# Patient Record
Sex: MALE | Race: WHITE | NOT HISPANIC OR LATINO | Employment: FULL TIME | URBAN - METROPOLITAN AREA
[De-identification: names, ages, dates, MRNs, and addresses within clinical notes are randomized per-mention and may not be internally consistent; named-entity substitution may affect disease eponyms.]

---

## 2017-05-18 ENCOUNTER — ALLSCRIPTS OFFICE VISIT (OUTPATIENT)
Dept: OTHER | Facility: OTHER | Age: 47
End: 2017-05-18

## 2017-06-06 ENCOUNTER — TRANSCRIBE ORDERS (OUTPATIENT)
Dept: ADMINISTRATIVE | Facility: HOSPITAL | Age: 47
End: 2017-06-06

## 2017-06-06 DIAGNOSIS — I48.91 ATRIAL FIBRILLATION, UNSPECIFIED TYPE (HCC): Primary | ICD-10-CM

## 2017-06-21 ENCOUNTER — HOSPITAL ENCOUNTER (OUTPATIENT)
Dept: NON INVASIVE DIAGNOSTICS | Facility: CLINIC | Age: 47
Discharge: HOME | End: 2017-06-21
Payer: COMMERCIAL

## 2017-06-21 DIAGNOSIS — I48.91 ATRIAL FIBRILLATION, UNSPECIFIED TYPE (HCC): ICD-10-CM

## 2017-06-21 PROCEDURE — 93306 TTE W/DOPPLER COMPLETE: CPT

## 2017-08-01 ENCOUNTER — ALLSCRIPTS OFFICE VISIT (OUTPATIENT)
Dept: OTHER | Facility: OTHER | Age: 47
End: 2017-08-01

## 2017-09-01 ENCOUNTER — ALLSCRIPTS OFFICE VISIT (OUTPATIENT)
Dept: OTHER | Facility: OTHER | Age: 47
End: 2017-09-01

## 2017-09-01 DIAGNOSIS — Z13.1 ENCOUNTER FOR SCREENING FOR DIABETES MELLITUS: ICD-10-CM

## 2017-09-01 DIAGNOSIS — I10 ESSENTIAL (PRIMARY) HYPERTENSION: ICD-10-CM

## 2017-09-01 DIAGNOSIS — Z13.220 ENCOUNTER FOR SCREENING FOR LIPOID DISORDERS: ICD-10-CM

## 2017-09-01 DIAGNOSIS — M25.475 EFFUSION OF LEFT FOOT: ICD-10-CM

## 2017-09-02 ENCOUNTER — GENERIC CONVERSION - ENCOUNTER (OUTPATIENT)
Dept: OTHER | Facility: OTHER | Age: 47
End: 2017-09-02

## 2017-09-02 LAB
AMBIG ABBREV CMP14 DEFAULT (HISTORICAL): NORMAL
AMBIG ABBREV LP DEFAULT (HISTORICAL): NORMAL

## 2017-09-03 LAB
A/G RATIO (HISTORICAL): 1.4 (ref 1.2–2.2)
ALBUMIN SERPL BCP-MCNC: 4.1 G/DL (ref 3.5–5.5)
ALP SERPL-CCNC: 69 IU/L (ref 39–117)
ALT SERPL W P-5'-P-CCNC: 19 IU/L (ref 0–44)
AST SERPL W P-5'-P-CCNC: 15 IU/L (ref 0–40)
BILIRUB SERPL-MCNC: 0.4 MG/DL (ref 0–1.2)
BUN SERPL-MCNC: 11 MG/DL (ref 6–24)
BUN/CREA RATIO (HISTORICAL): 14 (ref 9–20)
CALCIUM SERPL-MCNC: 9.1 MG/DL (ref 8.7–10.2)
CHLORIDE SERPL-SCNC: 98 MMOL/L (ref 96–106)
CHOLEST SERPL-MCNC: 123 MG/DL (ref 100–199)
CO2 SERPL-SCNC: 23 MMOL/L (ref 18–29)
CREAT SERPL-MCNC: 0.79 MG/DL (ref 0.76–1.27)
EGFR AFRICAN AMERICAN (HISTORICAL): 122 ML/MIN/1.73
EGFR-AMERICAN CALC (HISTORICAL): 105 ML/MIN/1.73
GLUCOSE SERPL-MCNC: 100 MG/DL (ref 65–99)
HDLC SERPL-MCNC: 39 MG/DL
LDLC SERPL CALC-MCNC: 56 MG/DL (ref 0–99)
POTASSIUM SERPL-SCNC: 4.3 MMOL/L (ref 3.5–5.2)
SODIUM SERPL-SCNC: 138 MMOL/L (ref 134–144)
TOT. GLOBULIN, SERUM (HISTORICAL): 2.9 G/DL (ref 1.5–4.5)
TOTAL PROTEIN (HISTORICAL): 7 G/DL (ref 6–8.5)
TRIGL SERPL-MCNC: 141 MG/DL (ref 0–149)
URIC ACID (HISTORICAL): 5.3 MG/DL (ref 3.7–8.6)
VLDLC SERPL CALC-MCNC: 28 MG/DL (ref 5–40)

## 2017-09-08 ENCOUNTER — HOSPITAL ENCOUNTER (OUTPATIENT)
Dept: ULTRASOUND IMAGING | Facility: CLINIC | Age: 47
Discharge: HOME | End: 2017-09-08
Payer: COMMERCIAL

## 2017-09-08 ENCOUNTER — APPOINTMENT (OUTPATIENT)
Dept: RADIOLOGY | Facility: CLINIC | Age: 47
End: 2017-09-08
Payer: COMMERCIAL

## 2017-09-08 DIAGNOSIS — M25.475 EFFUSION OF LEFT FOOT: ICD-10-CM

## 2017-09-08 PROCEDURE — 93971 EXTREMITY STUDY: CPT

## 2017-09-08 PROCEDURE — 73630 X-RAY EXAM OF FOOT: CPT

## 2018-01-13 VITALS
OXYGEN SATURATION: 96 % | TEMPERATURE: 97.6 F | BODY MASS INDEX: 37.31 KG/M2 | SYSTOLIC BLOOD PRESSURE: 130 MMHG | HEIGHT: 73 IN | HEART RATE: 82 BPM | DIASTOLIC BLOOD PRESSURE: 84 MMHG | WEIGHT: 281.5 LBS

## 2018-01-13 NOTE — PROGRESS NOTES
Assessment    1  Acute bacterial conjunctivitis of left eye (372 03) (H10 32)   2  Acute bacterial conjunctivitis of right eye (372 03) (H10 31)    Plan  Acute bacterial conjunctivitis of right eye    · TobraDex 0 3-0 1 % Ophthalmic Ointment; APPLY 1/2" RIBBON TO EYELIDS  TWICE DAILY    Discussion/Summary    Warm, compresses to both eyes 2-3 times a day  Strict handwashing  Wash pillowcases  Avoid wearing contact lenses  Chief Complaint  Patient is here today with complaints of red irritated eyes      History of Present Illness  HPI: Pt has had cough and cold symptoms for the past week  He developed right eye swelling over the past few days  he now has mild swelling in both eyes with crusting discharge  Cough and cold symptoms are improving  htn-f/b Dr Keyes Orn- dx 2012  f/b Dr Luh Calixto  Started [facility] last week  Review of Systems    Constitutional: no fever or chills, feels well, no tiredness, no recent weight loss or weight gain  ENT: nasal discharge, but as noted in HPI  Cardiovascular: no complaints of slow or fast heart rate, no chest pain, no palpitations, no leg claudication or lower extremity edema  Respiratory: no complaints of shortness of breath, no wheezing or cough, no dyspnea on exertion, no orthopnea or PND  Integumentary: no complaints of skin rash or lesion, no itching or dry skin, no skin wounds  Other Symptoms: eyes: swollen and crusted discharge bilateral       Active Problems    1  Actinic keratosis (702 0) (L57 0)   2  Atrial fibrillation (427 31) (I48 91)   3  Atypical 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 9) (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 9)   4  Esophageal reflux (530 81) (K21 9)   5  Hypertension (401 9) (I10)   6  Need for diphtheria-tetanus-pertussis (Tdap) vaccine, adult/adolescent (V06 1) (Z23)   7  Need for influenza vaccination (V04 81) (Z23)   8  Need for pneumococcal vaccination (V03 82) (Z23)   9  Negative depression screening    Past Medical History    1   H/O Malignant melanoma (V10 82) (Z85 820)   2  H/O nonmelanoma skin cancer (V10 83) (B45 857)   3  History of gastroesophageal reflux (GERD) (V12 79) (Z87 19)   4  History of hypercholesterolemia (V12 29) (Z86 39)   5  History of Vitamin D deficiency (268 9) (E55 9)  Active Problems And Past Medical History Reviewed: The active problems and past medical history were reviewed and updated today  Family History  Family History Reviewed: The family history was reviewed and updated today  Social History  The social history was reviewed and updated today  The social history was reviewed and is unchanged  Surgical History  Surgical History Reviewed: The surgical history was reviewed and updated today  Current Meds   1  Atorvastatin Calcium 10 MG Oral Tablet; take 1 tablet daily at bedtime; Therapy: 62TFS1812 to (Evaluate:08Jan2016)  Requested for: 13Apr2015; Last   Rx:13Apr2015 Ordered   2  Clopidogrel Bisulfate 75 MG Oral Tablet; TAKE 1 TABLET BY MOUTH EVERY DAY; Therapy: 94KHD7549 to (Evaluate:21Feb2016)  Requested for: 84OUB0659; Last   Rx:68Fpi9255 Ordered   3  Dilt- MG Oral Capsule Extended Release 24 Hour; take 1 capsule daily; Therapy: 12JEC8612 to (Last Rx:13Apr2015)  Requested for: 13Apr2015 Ordered   4  Flecainide Acetate 50 MG Oral Tablet; TAKE 1 TABLET TWICE DAILY; Therapy: (Recorded:57Ztf0816) to Recorded   5  Ibuprofen 600 MG Oral Tablet; TAKE 1 TABLET 3 TIMES DAILY WITH FOOD AS   NEEDED; Therapy: 21QSZ7218 to (Evaluate:03Jan2016)  Requested for: 01HGA8947; Last   Rx:46Dyi0380 Ordered   6  Multi-Vitamins TABS; Therapy: (Recorded:42Mqf0837) to Recorded   7  TiZANidine HCl - 2 MG Oral Tablet; TAKE 1 TABLET Bedtime; Therapy: 84IJN5943 to (Evaluate:13Jan2016)  Requested for: 36PDH9504; Last   Rx:00Mzd9492 Ordered   8  Vitamin D 1000 UNIT Oral Tablet Recorded   9  Xarelto 20 MG Oral Tablet; take 1 tablet every day; Therapy: (Recorded:77Gfr3302) to Recorded    Allergies    1  Aspirin CHEW   2  Penicillins   3  Tetracycline HCl CAPS    Vitals   Recorded: 79Qws7820 01:38PM   Temperature 97 6 F   Heart Rate 82   Systolic 998   Diastolic 84   Height 6 ft 1 in   Weight 281 lb 8 0 oz   BMI Calculated 37 14   BSA Calculated 2 48   O2 Saturation 96     Physical Exam    Constitutional   General appearance: No acute distress, well appearing and well nourished  Eyes   Conjunctiva and lids: Abnormal   (bilateral sclera are reddened, conjunctivae injected, + yellow stringy discharge in both eyes  )   Pupils and irises: Equal, round and reactive to light  Ears, Nose, Mouth, and Throat   External inspection of ears and nose: Normal     Otoscopic examination: Tympanic membrance translucent with normal light reflex  Canals patent without erythema  Nasal mucosa, septum, and turbinates: Abnormal   clear rhinorrhea  Oropharynx: Normal with no erythema, edema, exudate or lesions  Pulmonary   Respiratory effort: No increased work of breathing or signs of respiratory distress  Auscultation of lungs: Clear to auscultation, equal breath sounds bilaterally, no wheezes, no rales, no rhonci  Cardiovascular   Auscultation of heart: Normal rate and rhythm, normal S1 and S2, without murmurs  Lymphatic   Palpation of lymph nodes in neck: No lymphadenopathy  Musculoskeletal   Gait and station: Normal     Psychiatric   Orientation to person, place and time: Normal     Mood and affect: Normal          Future Appointments    Date/Time Provider Specialty Site   11/21/2017 08:00 AM MAGDY Ramos   Dermatology [facility]     Signatures   Electronically signed by : Carol Mckeon NP; Aug  1 2017  2:16PM EST                       (Author)    Electronically signed by : Dave Lyons MD; Aug  1 2017  4:46PM EST                       (Co-author)

## 2018-01-14 VITALS
SYSTOLIC BLOOD PRESSURE: 122 MMHG | TEMPERATURE: 97.9 F | WEIGHT: 276.25 LBS | OXYGEN SATURATION: 98 % | BODY MASS INDEX: 36.61 KG/M2 | HEIGHT: 73 IN | HEART RATE: 53 BPM | DIASTOLIC BLOOD PRESSURE: 80 MMHG

## 2018-02-08 ENCOUNTER — OFFICE VISIT (OUTPATIENT)
Dept: FAMILY MEDICINE CLINIC | Facility: CLINIC | Age: 48
End: 2018-02-08
Payer: COMMERCIAL

## 2018-02-08 VITALS
TEMPERATURE: 97.2 F | OXYGEN SATURATION: 95 % | BODY MASS INDEX: 37.73 KG/M2 | DIASTOLIC BLOOD PRESSURE: 80 MMHG | HEIGHT: 72 IN | SYSTOLIC BLOOD PRESSURE: 126 MMHG | WEIGHT: 278.6 LBS | HEART RATE: 50 BPM

## 2018-02-08 DIAGNOSIS — I48.20 CHRONIC ATRIAL FIBRILLATION (HCC): ICD-10-CM

## 2018-02-08 DIAGNOSIS — R09.82 POST-NASAL DRIP: ICD-10-CM

## 2018-02-08 DIAGNOSIS — I10 ESSENTIAL HYPERTENSION: ICD-10-CM

## 2018-02-08 DIAGNOSIS — R68.89 INTOLERANCE TO COLD: ICD-10-CM

## 2018-02-08 DIAGNOSIS — R05.9 COUGH: Primary | ICD-10-CM

## 2018-02-08 PROBLEM — D22.9 ATYPICAL NEVUS OF MULTIPLE SITES: Status: ACTIVE | Noted: 2017-05-18

## 2018-02-08 PROCEDURE — 99214 OFFICE O/P EST MOD 30 MIN: CPT | Performed by: NURSE PRACTITIONER

## 2018-02-08 PROCEDURE — 3074F SYST BP LT 130 MM HG: CPT | Performed by: NURSE PRACTITIONER

## 2018-02-08 PROCEDURE — 3008F BODY MASS INDEX DOCD: CPT | Performed by: NURSE PRACTITIONER

## 2018-02-08 PROCEDURE — 3079F DIAST BP 80-89 MM HG: CPT | Performed by: NURSE PRACTITIONER

## 2018-02-08 RX ORDER — BENZONATATE 200 MG/1
200 CAPSULE ORAL 3 TIMES DAILY PRN
Qty: 20 CAPSULE | Refills: 0 | Status: SHIPPED | OUTPATIENT
Start: 2018-02-08

## 2018-02-08 RX ORDER — ATORVASTATIN CALCIUM 10 MG/1
1 TABLET, FILM COATED ORAL DAILY
COMMUNITY
Start: 2012-03-26

## 2018-02-08 RX ORDER — FLECAINIDE ACETATE 100 MG/1
1 TABLET ORAL EVERY 12 HOURS
COMMUNITY

## 2018-02-08 RX ORDER — METOPROLOL SUCCINATE 25 MG/1
25 TABLET, EXTENDED RELEASE ORAL DAILY
COMMUNITY

## 2018-02-08 RX ORDER — DIPHENOXYLATE HYDROCHLORIDE AND ATROPINE SULFATE 2.5; .025 MG/1; MG/1
1 TABLET ORAL DAILY
COMMUNITY

## 2018-02-08 RX ORDER — FUROSEMIDE 40 MG/1
1 TABLET ORAL DAILY
COMMUNITY
Start: 2017-09-01

## 2018-02-08 NOTE — PROGRESS NOTES
Assessment/Plan:    No problem-specific Assessment & Plan notes found for this encounter  Diagnoses and all orders for this visit:    Cough  Comments:  Post tussive appearing to be related to his viral illness ordered tessalon pearles   Orders:  -     benzonatate (TESSALON) 200 MG capsule; Take 1 capsule (200 mg total) by mouth 3 (three) times a day as needed for cough    Post-nasal drip  -     benzonatate (TESSALON) 200 MG capsule; Take 1 capsule (200 mg total) by mouth 3 (three) times a day as needed for cough    Chronic atrial fibrillation (HCC)  -     CBC and differential; Future  -     Comprehensive metabolic panel; Future  -     TSH, 3rd generation with T4 reflex; Future    Essential hypertension  -     CBC and differential; Future  -     Comprehensive metabolic panel; Future  -     TSH, 3rd generation with T4 reflex; Future    Intolerance to cold  Comments:  Discussed with patient will update his labs including thyroid, CBC and CMP  Orders:  -     CBC and differential; Future  -     Comprehensive metabolic panel; Future  -     TSH, 3rd generation with T4 reflex; Future    Other orders  -     atorvastatin (LIPITOR) 10 mg tablet; Take 1 tablet by mouth daily  -     flecainide (TAMBOCOR) 100 mg tablet; Take 1 tablet by mouth every 12 (twelve) hours  -     furosemide (LASIX) 40 mg tablet; Take 1 tablet by mouth daily  -     metoprolol succinate (TOPROL-XL) 25 mg 24 hr tablet; Take 25 mg by mouth daily  -     multivitamin (THERAGRAN) TABS; Take 1 tablet by mouth daily  -     cholecalciferol (VITAMIN D3) 1,000 units tablet; Take 1 tablet by mouth daily  -     rivaroxaban (XARELTO) 20 mg tablet; Take 1 tablet by mouth daily          Vitals:    02/08/18 1529   BP: 126/80   Pulse: (!) 50   Temp: (!) 97 2 °F (36 2 °C)   SpO2: 95%       Subjective:      Patient ID: Luigi Hopkins is a 50 y o  male  Patient here with his wife and states that he is having coughing mostly at night   He states that his symptoms started about a week and a half ago with sinus congestion and coughing  Patient denies any fevers and does just still have a lingering cough at night and early Am  Patient states that he is unable to take OTC medications other than Tylenol r/t his other medications  Patient follows closely with Dr Anastasia Barnes for his Atrial fibrillation and aware of his low heart rate  Patient does have complaints of feeling cold intolerance at times and feeling sluggish at times  The following portions of the patient's history were reviewed and updated as appropriate: He  has no past medical history on file  He  does not have any pertinent problems on file  He  has no past surgical history on file  His family history is not on file  He  reports that he has never smoked  He does not have any smokeless tobacco history on file  His alcohol and drug histories are not on file  He is allergic to aspirin; penicillins; and tetracycline       Review of Systems   Constitutional: Positive for fatigue  Negative for activity change, appetite change, chills, diaphoresis, fever and unexpected weight change  HENT: Positive for postnasal drip and rhinorrhea  Negative for congestion, ear discharge, ear pain, hearing loss, sinus pain, sinus pressure, sneezing and sore throat  Eyes: Negative for pain, redness and visual disturbance  Respiratory: Positive for cough  Negative for choking, chest tightness, shortness of breath, wheezing and stridor  Cardiovascular: Negative for chest pain, palpitations and leg swelling  Gastrointestinal: Negative for abdominal pain, diarrhea, nausea and vomiting  Musculoskeletal: Negative for arthralgias  Neurological: Negative for dizziness and light-headedness  Psychiatric/Behavioral: Negative for behavioral problems and dysphoric mood  Objective:     Physical Exam   Constitutional: He is oriented to person, place, and time   Vital signs are normal  He appears well-developed and well-nourished  No distress  HENT:   Head: Normocephalic and atraumatic  Eyes: Pupils are equal, round, and reactive to light  Neck: Normal range of motion  No thyromegaly present  Cardiovascular: Normal rate, regular rhythm, normal heart sounds and intact distal pulses  No murmur heard  Pulmonary/Chest: Effort normal and breath sounds normal  No respiratory distress  He has no wheezes  Abdominal: Soft  Bowel sounds are normal    Musculoskeletal: Normal range of motion  Neurological: He is alert and oriented to person, place, and time  Skin: Skin is warm and dry  Psychiatric: He has a normal mood and affect

## 2018-05-24 ENCOUNTER — OFFICE VISIT (OUTPATIENT)
Dept: DERMATOLOGY | Facility: CLINIC | Age: 48
End: 2018-05-24
Payer: COMMERCIAL

## 2018-05-24 DIAGNOSIS — L57.0 ACTINIC KERATOSIS: Primary | ICD-10-CM

## 2018-05-24 DIAGNOSIS — L82.1 SEBORRHEIC KERATOSIS: ICD-10-CM

## 2018-05-24 DIAGNOSIS — Z85.820 HISTORY OF MELANOMA: ICD-10-CM

## 2018-05-24 DIAGNOSIS — Z85.828 HISTORY OF SKIN CANCER: ICD-10-CM

## 2018-05-24 DIAGNOSIS — Z13.89 SCREENING FOR SKIN CONDITION: ICD-10-CM

## 2018-05-24 PROCEDURE — 17000 DESTRUCT PREMALG LESION: CPT | Performed by: DERMATOLOGY

## 2018-05-24 PROCEDURE — 99213 OFFICE O/P EST LOW 20 MIN: CPT | Performed by: DERMATOLOGY

## 2018-05-24 PROCEDURE — 17003 DESTRUCT PREMALG LES 2-14: CPT | Performed by: DERMATOLOGY

## 2018-05-24 NOTE — PROGRESS NOTES
[address] [facility] [address] [facility] DERMATOLOGY  [address] [place] [address]     MRN: [id number] : 1970  Encounter: [id number]  Patient Information: Kelly Sheikh  Chief complaint: six-month checkup    History of present illness:  39-year-old male with history of both melanoma nonmelanoma skin cancer and strong family history of skin melanoma presents for overall skin check no specific concerns noted today  History reviewed  No pertinent past medical history  History reviewed  No pertinent surgical history  Social History   History   Alcohol Use No     History   Drug Use No     History   Smoking Status    Never Smoker   Smokeless Tobacco    Never Used     No family history on file  Meds/Allergies   Allergies   Allergen Reactions    Aspirin      Reaction Date: ;     Penicillins     Tetracycline        Meds:  Prior to Admission medications    Medication Sig Start Date End Date Taking?  Authorizing Provider   atorvastatin (LIPITOR) 10 mg tablet Take 1 tablet by mouth daily 3/26/12  Yes Historical Provider, MD   cholecalciferol (VITAMIN D3) 1,000 units tablet Take 1 tablet by mouth daily   Yes Historical Provider, MD   flecainide (TAMBOCOR) 100 mg tablet Take 1 tablet by mouth every 12 (twelve) hours   Yes Historical Provider, MD   metoprolol succinate (TOPROL-XL) 25 mg 24 hr tablet Take 25 mg by mouth daily   Yes Historical Provider, MD   metoprolol tartrate (LOPRESSOR) 25 mg tablet  18  Yes Historical Provider, MD   multivitamin (THERAGRAN) TABS Take 1 tablet by mouth daily   Yes Historical Provider, MD   rivaroxaban (XARELTO) 20 mg tablet Take 1 tablet by mouth daily   Yes Historical Provider, MD   benzonatate (TESSALON) 200 MG capsule Take 1 capsule (200 mg total) by mouth 3 (three) times a day as needed for cough 18   JAVED Moore   furosemide (LASIX) 40 mg tablet Take 1 tablet by mouth daily 17   Historical Provider, MD Subjective:     Review of Systems:    General: negative for - chills, fatigue, fever,  weight gain or weight loss  Psychological: negative for - anxiety, behavioral disorder, concentration difficulties, decreased libido, depression, irritability, memory difficulties, mood swings, sleep disturbances or suicidal ideation  ENT: negative for - hearing difficulties , nasal congestion, nasal discharge, oral lesions, sinus pain, sneezing, sore throat  Allergy and Immunology: negative for - hives, insect bite sensitivity,  Hematological and Lymphatic: negative for - bleeding problems, blood clots,bruising, swollen lymph nodes  Endocrine: negative for - hair pattern changes, hot flashes, malaise/lethargy, mood swings, palpitations, polydipsia/polyuria, skin changes, temperature intolerance or unexpected weight change  Respiratory: negative for - cough, hemoptysis, orthopnea, shortness of breath, or wheezing  Cardiovascular: negative for - chest pain, dyspnea on exertion, edema,  Gastrointestinal: negative for - abdominal pain, nausea/vomiting  Genito-Urinary: negative for - dysuria, incontinence, irregular/heavy menses or urinary frequency/urgency  Musculoskeletal: negative for - gait disturbance, joint pain, joint stiffness, joint swelling, muscle pain, muscular weakness  Dermatological:  As in HPI  Neurological: negative for confusion, dizziness, headaches, impaired coordination/balance, memory loss, numbness/tingling, seizures, speech problems, tremors or weakness       Objective: There were no vitals taken for this visit      Physical Exam:    General Appearance:    Alert, cooperative, no distress   Head:    Normocephalic, without obvious abnormality, atraumatic   Lymphatics:    No lymphadenopathy noted      Abdomen:   No hepatosplenomegaly   Skin:   A full skin exam was performed including scalp, head scalp, eyes, ears, nose, lips, neck, chest, axilla, abdomen, back, buttocks, bilateral upper extremities, bilateral lower extremities, hands, feet, fingers, toes, fingernails, and toenails  Scaling erythematous areas as noted below normal keratotic papules with greasy stuck on appearance normal pigmented lesions with regular shape and color nothing else markedly atypical previous site of skin cancer and melanoma well healed without recurrence   Physical Exam   HENT:   Head:         Cryotherapy Procedure Note    Pre-operative Diagnosis: actinic keratosis    Plan:  1  Instructed to keep the area dry and clean thereafter  Apply petrolatum if area gets crusty  2  Recommended that the patient use acetaminophen  as needed for pain  Locations:  scalp    Indications: Destruction of  Actinic keratosis x3    Patient informed of risks (permanent scarring, infection, light or dark discoloration, bleeding, infection, weakness, numbness and recurrence of the lesion) and benefits of the procedure and verbal informed consent obtained  The areas are treated with liquid nitrogen therapy, frozen until ice ball extended 2 mm beyond lesion, allowed to thaw, and treated again  The patient tolerated procedure well  The patient was instructed on post-op care, warned that there may be blister formation, redness and pain  Recommend OTC analgesia as needed for pain  Condition:  Stable    Complications:  none  Assessment:     1  Actinic keratosis     2  Seborrheic keratosis     3  Screening for skin condition     4  History of skin cancer     5  History of melanoma      4/12 superficial spreading Horacio level III Breslow thicknes 0 58mm         Plan:   Actinic Keratosis:  Patient advised lesions are precancers  These should resolve with cryosurgery if not to let us know sun block recommended     history of melanoma continue to monitor closely with family history as well as on personal problem advise importance of self exam and sun protection all his nevi appear small regular nothing markedly atypical  Seborrheic keratosis patient reassured these are normal growths we acquire with age no treatment needed  History of skin cancer in no recurrence nothing else atypical sunblock recommended follow-up in 6 months  Screening for dermatologic disorders nothing else of concern noted on complete exam follow-up in 6 months    Lorie Santo MD  1/75/8698,3:74 AM    Portions of the record may have been created with voice recognition software   Occasional wrong word or "sound a like" substitutions may have occurred due to the inherent limitations of voice recognition software   Read the chart carefully and recognize, using context, where substitutions have occurred

## 2018-05-24 NOTE — PATIENT INSTRUCTIONS
Actinic Keratosis:  Patient advised lesions are precancers  These should resolve with cryosurgery if not to let us know sun block recommended  history of melanoma continue to monitor closely with family history as well as on personal problem advise importance of self exam and sun protection all his nevi appear small regular nothing markedly atypical  Seborrheic keratosis patient reassured these are normal growths we acquire with age no treatment needed  History of skin cancer in no recurrence nothing else atypical sunblock recommended follow-up in 6 months  Screening for dermatologic disorders nothing else of concern noted on complete exam follow-up in 6 months  Treatment with Cryotherapy    The doctor has treated your skin with nitrogen, which is 320 degrees Fahrenheit below zero  He has given the treated area "frostbite "    Stinging should subside within a few hours  You can take Tylenol for pain, if needed  Over the next few days, it is normal if the area becomes reddened, a blood blister, or swollen with fluid  If the lesion treated was near the eye - you could get a swollen eye over the next few days  Do not panic! This is all temporary, and will resolve with time  There is no special treatment - just keep the area clean  Makeup and BandAids can be used, if preferred  When the area starts to dry up and peel off, using Vaseline can help healing  It usually takes up to a month for it to heal   Some lesions are recurrent and may require repeat treatments  If a lesion has not healed in one month, please don't hesitate to contact us  If you have any further questions that are not answered here, please call us  [phone]    Thank you for allowing us to care for you

## 2019-04-25 ENCOUNTER — OFFICE VISIT (OUTPATIENT)
Dept: DERMATOLOGY | Facility: CLINIC | Age: 49
End: 2019-04-25
Payer: COMMERCIAL

## 2019-04-25 DIAGNOSIS — Z85.820 HISTORY OF MELANOMA: Chronic | ICD-10-CM

## 2019-04-25 DIAGNOSIS — L82.1 SEBORRHEIC KERATOSIS: Primary | ICD-10-CM

## 2019-04-25 DIAGNOSIS — D22.9 ATYPICAL NEVUS OF MULTIPLE SITES: ICD-10-CM

## 2019-04-25 DIAGNOSIS — L57.0 ACTINIC KERATOSIS: ICD-10-CM

## 2019-04-25 DIAGNOSIS — Z13.89 SCREENING FOR SKIN CONDITION: ICD-10-CM

## 2019-04-25 PROCEDURE — 17003 DESTRUCT PREMALG LES 2-14: CPT | Performed by: DERMATOLOGY

## 2019-04-25 PROCEDURE — 99213 OFFICE O/P EST LOW 20 MIN: CPT | Performed by: DERMATOLOGY

## 2019-04-25 PROCEDURE — 17000 DESTRUCT PREMALG LESION: CPT | Performed by: DERMATOLOGY

## 2020-01-21 ENCOUNTER — TELEPHONE (OUTPATIENT)
Dept: FAMILY MEDICINE CLINIC | Facility: CLINIC | Age: 50
End: 2020-01-21

## 2020-01-21 NOTE — TELEPHONE ENCOUNTER
Sue Hart called saying that Mariel Zaman has diarrhea and would like to know if there is something OTC that he can take  He has the allergy to Aspirin so she thought he couldn't take that  Please advise

## 2023-01-10 ENCOUNTER — OFFICE VISIT (OUTPATIENT)
Dept: URGENT CARE | Facility: CLINIC | Age: 53
End: 2023-01-10

## 2023-01-10 VITALS
WEIGHT: 295 LBS | BODY MASS INDEX: 40.01 KG/M2 | SYSTOLIC BLOOD PRESSURE: 148 MMHG | RESPIRATION RATE: 16 BRPM | TEMPERATURE: 97.5 F | DIASTOLIC BLOOD PRESSURE: 100 MMHG | HEART RATE: 99 BPM | OXYGEN SATURATION: 100 %

## 2023-01-10 DIAGNOSIS — R05.1 ACUTE COUGH: Primary | ICD-10-CM

## 2023-01-10 PROBLEM — I48.3 TYPICAL ATRIAL FLUTTER (HCC): Status: ACTIVE | Noted: 2018-05-24

## 2023-01-10 PROBLEM — E66.09 CLASS 2 OBESITY DUE TO EXCESS CALORIES IN ADULT: Status: ACTIVE | Noted: 2018-05-24

## 2023-01-10 PROBLEM — E78.5 DYSLIPIDEMIA: Status: ACTIVE | Noted: 2018-05-24

## 2023-01-10 PROBLEM — E66.812 CLASS 2 OBESITY DUE TO EXCESS CALORIES IN ADULT: Status: ACTIVE | Noted: 2018-05-24

## 2023-01-10 RX ORDER — BENZONATATE 200 MG/1
200 CAPSULE ORAL 3 TIMES DAILY PRN
Qty: 20 CAPSULE | Refills: 0 | Status: SHIPPED | OUTPATIENT
Start: 2023-01-10

## 2023-01-10 NOTE — PROGRESS NOTES
[address] [facility] [facility]        NAME: Roslyn Yee is a 45 y o  male  : 1970    MRN: [id number]  DATE: January 10, 2023  TIME: 11:57 AM    Assessment and Plan   Acute cough [R05 1]  1  Acute cough  benzonatate (TESSALON) 200 MG capsule        Symptoms consistent with viral illness  Sent in tessalon [facility]ls  Given education on supportive measures for symptoms in discharge instructions  Follow up with primary care in 3-5 days  Go to ER if symptoms get worse  Patient Instructions     --Rest, drink plenty of fluids     --For nasal/sinus congestion, you can try steam, warm compresses, saline nasal spray, Neti pot, nasal steroid (Flonase, Nasocort) to be used at bedtime after the saline nasal spray or nasal decongestant (Afrin, Igor-synephrine - for 3 days max or you can get rebound symptoms) may be taken  Can also decongestant (such as Sudafed) if > 10years of age and no history of high blood pressure  --For cough, you can take an OTC expectorant such as plain Robitussion or Mucinex  A spoonful of honey at bedtime may also be helpful  --For sore throat, you can take OTC lozenges, use warm gargles (salt water or apple cider vinegar and honey), herbal teas, or an OTC throat spray (Chloraseptic)  --You can take Tylenol or Motrin/Advil as needed for fever, headache, body aches  Do not take Motrin/Advil if you have a history of heart disease, bleeding ulcers, or if you take blood thinners      -For cold symptoms with high blood pressure take Coricidin cough/cold  --You should contact your primary care provider and/or go to the ER if your symptoms are not improved or get worse over the next 7 days  This includes new onset fever, localized ear pain, sinus pain, as well as worsening cough, chest pain, shortness of breath, or significant weakness/fatigue  Chief Complaint     Chief Complaint   Patient presents with   • Cough     2-3 days cough, no fever  Headache  Taking tylenol   Negative home covid test this morning  History of Present Illness       Presents with sick symptoms for 2-3 days including cough and Headache  Taking tylenol for symptoms  Denies fever or chills  Negative home covid test this morning  Known sick contacts      Review of Systems   Review of Systems   Constitutional: Negative for chills and fever  HENT: Positive for sore throat  Negative for ear pain  Respiratory: Positive for cough  Negative for shortness of breath  Cardiovascular: Negative for chest pain and palpitations  Gastrointestinal: Negative for diarrhea, nausea and vomiting  Musculoskeletal: Negative for myalgias  Skin: Negative for color change and rash  Psychiatric/Behavioral: Negative for confusion  All other systems reviewed and are negative          Current Medications       Current Outpatient Medications:   •  atorvastatin (LIPITOR) 10 mg tablet, Take 1 tablet by mouth daily, Disp: , Rfl:   •  benzonatate (TESSALON) 200 MG capsule, Take 1 capsule (200 mg total) by mouth 3 (three) times a day as needed for cough, Disp: 20 capsule, Rfl: 0  •  cholecalciferol (VITAMIN D3) 1,000 units tablet, Take 1 tablet by mouth daily, Disp: , Rfl:   •  flecainide (TAMBOCOR) 100 mg tablet, Take 1 tablet by mouth every 12 (twelve) hours, Disp: , Rfl:   •  furosemide (LASIX) 40 mg tablet, Take 1 tablet by mouth daily, Disp: , Rfl:   •  metoprolol succinate (TOPROL-XL) 25 mg 24 hr tablet, Take 25 mg by mouth daily, Disp: , Rfl:   •  metoprolol tartrate (LOPRESSOR) 25 mg tablet, , Disp: , Rfl:   •  multivitamin (THERAGRAN) TABS, Take 1 tablet by mouth daily, Disp: , Rfl:   •  rivaroxaban (XARELTO) 20 mg tablet, Take 1 tablet by mouth daily, Disp: , Rfl:     Current Allergies     Allergies as of 01/10/2023 - Reviewed 01/10/2023   Allergen Reaction Noted   • Aspirin  04/22/2012   • Penicillins  05/22/2012   • Tetracycline  05/22/2012            The following portions of the patient's history were reviewed and updated as appropriate: allergies, current medications, past family history, past medical history, past social history, past surgical history and problem list      Past Medical History:   Diagnosis Date   • GERD (gastroesophageal reflux disease)    • Hypercholesteremia    • Malignant melanoma ([facility] [address] ) 04/2012    Superficial spreading Horacio level III Breaslow thickness 0 58 mm   • Nonmelanoma skin cancer     Last Assessed:  5/18/17   • Vitamin D deficiency        History reviewed  No pertinent surgical history  No family history on file  Medications have been verified  Objective   /100   Pulse 99   Temp 97 5 °F (36 4 °C)   Resp 16   Wt 134 kg (295 lb)   SpO2 100%   BMI 40 01 kg/m²        Physical Exam     Physical Exam  Vitals reviewed  Constitutional:       General: He is not in acute distress  Appearance: Normal appearance  HENT:      Right Ear: Tympanic membrane, ear canal and external ear normal       Left Ear: Tympanic membrane, ear canal and external ear normal       Nose: Nose normal       Mouth/Throat:      Mouth: Mucous membranes are moist       Pharynx: Posterior oropharyngeal erythema present  Eyes:      Conjunctiva/sclera: Conjunctivae normal    Cardiovascular:      Rate and Rhythm: Normal rate and regular rhythm  Pulses: Normal pulses  Heart sounds: Normal heart sounds  No murmur heard  Pulmonary:      Effort: Pulmonary effort is normal  No respiratory distress  Breath sounds: Normal breath sounds  Skin:     General: Skin is warm and dry  Neurological:      General: No focal deficit present  Mental Status: He is alert and oriented to person, place, and time     Psychiatric:         Mood and Affect: Mood normal          Behavior: Behavior normal

## 2023-01-10 NOTE — LETTER
January 10, 2023     Patient: Ayana Angel   YOB: 1970   Date of Visit: 1/10/2023       To Whom it May Concern:    Mariaelena Raymond was seen in my clinic on 1/10/2023  He should be excused 1/9, 1/10, and 1/11/23  If you have any questions or concerns, please don't hesitate to call           Sincerely,          JAVED Castañead        CC: No Recipients

## 2024-02-21 PROBLEM — Z13.89 SCREENING FOR SKIN CONDITION: Status: RESOLVED | Noted: 2018-05-24 | Resolved: 2024-02-21

## 2024-02-21 PROBLEM — R05.9 COUGH: Status: RESOLVED | Noted: 2018-02-08 | Resolved: 2024-02-21

## 2024-10-28 ENCOUNTER — OFFICE VISIT (OUTPATIENT)
Dept: FAMILY MEDICINE CLINIC | Facility: CLINIC | Age: 54
End: 2024-10-28
Payer: COMMERCIAL

## 2024-10-28 ENCOUNTER — TELEPHONE (OUTPATIENT)
Age: 54
End: 2024-10-28

## 2024-10-28 VITALS
HEIGHT: 72 IN | BODY MASS INDEX: 40.77 KG/M2 | TEMPERATURE: 98.1 F | DIASTOLIC BLOOD PRESSURE: 98 MMHG | SYSTOLIC BLOOD PRESSURE: 150 MMHG | OXYGEN SATURATION: 100 % | HEART RATE: 61 BPM | WEIGHT: 301 LBS

## 2024-10-28 DIAGNOSIS — I48.3 TYPICAL ATRIAL FLUTTER (HCC): ICD-10-CM

## 2024-10-28 DIAGNOSIS — Z12.11 SCREEN FOR COLON CANCER: ICD-10-CM

## 2024-10-28 DIAGNOSIS — E78.5 DYSLIPIDEMIA: ICD-10-CM

## 2024-10-28 DIAGNOSIS — Z00.00 ANNUAL PHYSICAL EXAM: Primary | ICD-10-CM

## 2024-10-28 DIAGNOSIS — Z13.1 SCREENING FOR DIABETES MELLITUS (DM): ICD-10-CM

## 2024-10-28 DIAGNOSIS — E66.01 CLASS 3 SEVERE OBESITY DUE TO EXCESS CALORIES WITH SERIOUS COMORBIDITY AND BODY MASS INDEX (BMI) OF 40.0 TO 44.9 IN ADULT (HCC): ICD-10-CM

## 2024-10-28 DIAGNOSIS — F43.21 SITUATIONAL DEPRESSION: ICD-10-CM

## 2024-10-28 DIAGNOSIS — Z12.5 SCREENING FOR PROSTATE CANCER: ICD-10-CM

## 2024-10-28 DIAGNOSIS — Z85.828 HISTORY OF SKIN CANCER: ICD-10-CM

## 2024-10-28 DIAGNOSIS — I10 PRIMARY HYPERTENSION: ICD-10-CM

## 2024-10-28 DIAGNOSIS — E66.813 CLASS 3 SEVERE OBESITY DUE TO EXCESS CALORIES WITH SERIOUS COMORBIDITY AND BODY MASS INDEX (BMI) OF 40.0 TO 44.9 IN ADULT (HCC): ICD-10-CM

## 2024-10-28 DIAGNOSIS — Z85.820 HISTORY OF MELANOMA: ICD-10-CM

## 2024-10-28 DIAGNOSIS — I48.91 ATRIAL FIBRILLATION, UNSPECIFIED TYPE (HCC): ICD-10-CM

## 2024-10-28 DIAGNOSIS — K21.9 GASTROESOPHAGEAL REFLUX DISEASE, UNSPECIFIED WHETHER ESOPHAGITIS PRESENT: ICD-10-CM

## 2024-10-28 PROBLEM — R09.82 POST-NASAL DRIP: Status: RESOLVED | Noted: 2018-02-08 | Resolved: 2024-10-28

## 2024-10-28 PROBLEM — R68.89 INTOLERANCE TO COLD: Status: RESOLVED | Noted: 2018-02-08 | Resolved: 2024-10-28

## 2024-10-28 PROCEDURE — 99386 PREV VISIT NEW AGE 40-64: CPT | Performed by: NURSE PRACTITIONER

## 2024-10-28 NOTE — ASSESSMENT & PLAN NOTE
Orders:    Comprehensive metabolic panel; Future    CBC and differential; Future    Lipid panel; Future  Taking the Xarelto and BB

## 2024-10-28 NOTE — PROGRESS NOTES
Adult Annual Physical  Name: Dwight Chappell      : 1970      MRN: [id number]  Encounter Provider: JAVED Montes  Encounter Date: 10/28/2024   Encounter department: [facility]    Assessment & Plan  Annual physical exam         Typical atrial flutter (HCC)  Patient following with cardiology       Primary hypertension  BP elevated in office today on Toprol 25 mg and Flecainide 100 mg bid        Atrial fibrillation, unspecified type (HCC)    Orders:    Comprehensive metabolic panel; Future    CBC and differential; Future    Lipid panel; Future  Taking the Xarelto and BB  Gastroesophageal reflux disease, unspecified whether esophagitis present         History of melanoma       Referral to dermatology history of melanoma years ago  Dyslipidemia    Orders:    Comprehensive metabolic panel; Future    Lipid panel; Future    Class 3 severe obesity due to excess calories with serious comorbidity and body mass index (BMI) of 40.0 to 44.9 in adult (HCC)         History of skin cancer    Orders:    Ambulatory Referral to Dermatology; Future    Screening for prostate cancer    Orders:    PSA, Total Screen; Future    Screen for colon cancer    Orders:    Ambulatory Referral to Gastroenterology; Future    Screening for diabetes mellitus (DM)    Orders:    Hemoglobin A1C; Future    Situational depression      Orders:    Ambulatory referral to Psych Services; Future    Immunizations and preventive care screenings were discussed with patient today. Appropriate education was printed on patient's after visit summary.    Discussed risks and benefits of prostate cancer screening. We discussed the controversial history of PSA screening for prostate cancer in the [place] as well as the risk of over detection and over treatment of prostate cancer by way of PSA screening.  The patient understands that PSA blood testing is an imperfect way to screen for prostate cancer and that elevated PSA  levels in the blood may also be caused by infection, inflammation, prostatic trauma or manipulation, urological procedures, or by benign prostatic enlargement.    The role of the digital rectal examination in prostate cancer screening was also discussed and I discussed with him that there is large interobserver variability in the findings of digital rectal examination.    Counseling:  Injury prevention: discussed safety/seat belts, safety helmets, smoke detectors, carbon monoxide detectors, and smoking near bedding or upholstery.  Exercise: the importance of regular exercise/physical activity was discussed. Recommend exercise 3-5 times per week for at least 30 minutes.          History of Present Illness     Adult Annual Physical:  Patient presents for annual physical. Patient here today to reestablish care Patient reports that he has a [employer] license and was sent for a sleep apnea testing and was sent to follow up from the results and has no report to review. Patient following with cardiology with [facility]. Has a history of atrial/fibrillation and a-flutter. Patient also has a history of melanoma and needs to have a referral placed. Patient is interested in pursuing a therapist to talk with regarding recent losses in his family. .     Diet and Physical Activity:  - Diet/Nutrition: poor diet.  - Exercise: walking.    Depression Screening:  - PHQ-2 Score: 0    General Health:  - Sleep: sleeps well and 4-6 hours of sleep on average.  - Hearing: normal hearing bilateral ears.  - Vision: wears contacts and goes for regular eye exams.  - Dental: no dental visits for > 1 year.     Health:    - Urinary symptoms: none.     Review of Systems   Constitutional:  Negative for activity change, appetite change, chills, diaphoresis, fatigue, fever and unexpected weight change.   HENT:  Negative for congestion, ear pain, hearing loss, postnasal drip, sinus pressure, sinus pain, sneezing and sore throat.    Eyes:  Negative for pain,  redness and visual disturbance.   Respiratory:  Negative for cough and shortness of breath.    Cardiovascular:  Negative for chest pain and leg swelling.   Gastrointestinal:  Negative for abdominal pain, diarrhea, nausea and vomiting.   Endocrine: Negative.    Genitourinary: Negative.    Musculoskeletal:  Positive for arthralgias.   Skin: Negative.    Allergic/Immunologic: Negative.    Neurological:  Negative for dizziness and light-headedness.   Hematological: Negative.    Psychiatric/Behavioral:  Negative for behavioral problems and dysphoric mood.        Objective     /98 (BP Location: Left arm, Patient Position: Sitting, Cuff Size: Large)   Pulse 61   Temp 98.1 °F (36.7 °C)   Ht 6' (1.829 m)   Wt (!) 137 kg (301 lb)   SpO2 100%   BMI 40.82 kg/m²     Physical Exam  Constitutional:       General: He is not in acute distress.     Appearance: He is well-developed.   HENT:      Head: Normocephalic and atraumatic.      Right Ear: Tympanic membrane normal.      Left Ear: Tympanic membrane normal.   Eyes:      Pupils: Pupils are equal, round, and reactive to light.   Neck:      Thyroid: No thyromegaly.   Cardiovascular:      Rate and Rhythm: Normal rate and regular rhythm.      Heart sounds: Normal heart sounds. No murmur heard.  Pulmonary:      Effort: Pulmonary effort is normal. No respiratory distress.      Breath sounds: Normal breath sounds. No wheezing.   Abdominal:      General: Bowel sounds are normal.      Palpations: Abdomen is soft.   Musculoskeletal:         General: Normal range of motion.      Cervical back: Normal range of motion.   Skin:     General: Skin is warm and dry.   Neurological:      Mental Status: He is alert and oriented to person, place, and time.

## 2024-10-28 NOTE — PATIENT INSTRUCTIONS
"Patient Education     Routine physical for adults   The Basics   Written by the doctors and editors at [facility]   What is a physical? -- A physical is a routine visit, or \"check-up,\" with your doctor. You might also hear it called a \"wellness visit\" or \"preventive visit.\"  During each visit, the doctor will:   Ask about your physical and mental health   Ask about your habits, behaviors, and lifestyle   Do an exam   Give you vaccines if needed   Talk to you about any medicines you take   Give advice about your health   Answer your questions  Getting regular check-ups is an important part of taking care of your health. It can help your doctor find and treat any problems you have. But it's also important for preventing health problems.  A routine physical is different from a \"sick visit.\" A sick visit is when you see a doctor because of a health concern or problem. Since physicals are scheduled ahead of time, you can think about what you want to ask the doctor.  How often should I get a physical? -- It depends on your age and health. In general, for people age 21 years and older:   If you are younger than 50 years, you might be able to get a physical every 3 years.   If you are 50 years or older, your doctor might recommend a physical every year.  If you have an ongoing health condition, like diabetes or high blood pressure, your doctor will probably want to see you more often.  What happens during a physical? -- In general, each visit will include:   Physical exam - The doctor or nurse will check your height, weight, heart rate, and blood pressure. They will also look at your eyes and ears. They will ask about how you are feeling and whether you have any symptoms that bother you.   Medicines - It's a good idea to bring a list of all the medicines you take to each doctor visit. Your doctor will talk to you about your medicines and answer any questions. Tell them if you are having any side effects that bother you. You " "should also tell them if you are having trouble paying for any of your medicines.   Habits and behaviors - This includes:   Your diet   Your exercise habits   Whether you smoke, drink alcohol, or use drugs   Whether you are sexually active   Whether you feel safe at home  Your doctor will talk to you about things you can do to improve your health and lower your risk of health problems. They will also offer help and support. For example, if you want to quit smoking, they can give you advice and might prescribe medicines. If you want to improve your diet or get more physical activity, they can help you with this, too.   Lab tests, if needed - The tests you get will depend on your age and situation. For example, your doctor might want to check your:   Cholesterol   Blood sugar   Iron level   Vaccines - The recommended vaccines will depend on your age, health, and what vaccines you already had. Vaccines are very important because they can prevent certain serious or deadly infections.   Discussion of screening - \"Screening\" means checking for diseases or other health problems before they cause symptoms. Your doctor can recommend screening based on your age, risk, and preferences. This might include tests to check for:   Cancer, such as breast, prostate, cervical, ovarian, colorectal, prostate, lung, or skin cancer   Sexually transmitted infections, such as chlamydia and gonorrhea   Mental health conditions like depression and anxiety  Your doctor will talk to you about the different types of screening tests. They can help you decide which screenings to have. They can also explain what the results might mean.   Answering questions - The physical is a good time to ask the doctor or nurse questions about your health. If needed, they can refer you to other doctors or specialists, too.  Adults older than 65 years often need other care, too. As you get older, your doctor will talk to you about:   How to prevent falling at " home   Hearing or vision tests   Memory testing   How to take your medicines safely   Making sure that you have the help and support you need at home  All topics are updated as new evidence becomes available and our peer review process is complete.  This topic retrieved from [employer] on: May 02, 2024.  Topic 115150 Version 1.0  Release: 32.4.3 - C32.122  © 2024 UpToDate, Inc. and/or its affiliates. All rights reserved.  Consumer Information Use and Disclaimer   Disclaimer: This generalized information is a limited summary of diagnosis, treatment, and/or medication information. It is not meant to be comprehensive and should be used as a tool to help the user understand and/or assess potential diagnostic and treatment options. It does NOT include all information about conditions, treatments, medications, side effects, or risks that may apply to a specific patient. It is not intended to be medical advice or a substitute for the medical advice, diagnosis, or treatment of a health care provider based on the health care provider's examination and assessment of a patient's specific and unique circumstances. Patients must speak with a health care provider for complete information about their health, medical questions, and treatment options, including any risks or benefits regarding use of medications. This information does not endorse any treatments or medications as safe, effective, or approved for treating a specific patient. UpToDate, Inc. and its affiliates disclaim any warranty or liability relating to this information or the use thereof.The use of this information is governed by the Terms of Use, available at https://www.wolters"[employer]uwer.com/en/know/clinical-effectiveness-terms. 2024© UpToDate, Inc. and its affiliates and/or licensors. All rights reserved.  Copyright   © 2024 UpToDate, Inc. and/or its affiliates. All rights reserved.

## 2024-10-28 NOTE — TELEPHONE ENCOUNTER
Writer contacted pt regarding referral for [facility] to verify services needed and schedule an appt. Pt stated that he'll call back to schedule whenever he is ready. Referral closed.

## 2024-11-04 ENCOUNTER — APPOINTMENT (OUTPATIENT)
Dept: LAB | Facility: CLINIC | Age: 54
End: 2024-11-04
Payer: COMMERCIAL

## 2024-11-04 DIAGNOSIS — I10 ESSENTIAL HYPERTENSION, MALIGNANT: ICD-10-CM

## 2024-11-04 DIAGNOSIS — Z13.1 SCREENING FOR DIABETES MELLITUS (DM): ICD-10-CM

## 2024-11-04 DIAGNOSIS — E78.5 HYPERLIPIDEMIA, UNSPECIFIED HYPERLIPIDEMIA TYPE: ICD-10-CM

## 2024-11-04 DIAGNOSIS — Z12.5 SCREENING FOR PROSTATE CANCER: ICD-10-CM

## 2024-11-04 DIAGNOSIS — E78.5 DYSLIPIDEMIA: ICD-10-CM

## 2024-11-04 DIAGNOSIS — I48.91 ATRIAL FIBRILLATION, UNSPECIFIED TYPE (HCC): ICD-10-CM

## 2024-11-04 LAB
ALBUMIN SERPL BCG-MCNC: 4.4 G/DL (ref 3.5–5)
ALP SERPL-CCNC: 59 U/L (ref 34–104)
ALT SERPL W P-5'-P-CCNC: 18 U/L (ref 7–52)
ANION GAP SERPL CALCULATED.3IONS-SCNC: 7 MMOL/L (ref 4–13)
AST SERPL W P-5'-P-CCNC: 18 U/L (ref 13–39)
BASOPHILS # BLD AUTO: 0.02 THOUSANDS/ΜL (ref 0–0.1)
BASOPHILS NFR BLD AUTO: 0 % (ref 0–1)
BILIRUB SERPL-MCNC: 0.63 MG/DL (ref 0.2–1)
BUN SERPL-MCNC: 15 MG/DL (ref 5–25)
CALCIUM SERPL-MCNC: 9.4 MG/DL (ref 8.4–10.2)
CHLORIDE SERPL-SCNC: 102 MMOL/L (ref 96–108)
CHOLEST SERPL-MCNC: 142 MG/DL
CO2 SERPL-SCNC: 28 MMOL/L (ref 21–32)
CREAT SERPL-MCNC: 0.79 MG/DL (ref 0.6–1.3)
EOSINOPHIL # BLD AUTO: 0.09 THOUSAND/ΜL (ref 0–0.61)
EOSINOPHIL NFR BLD AUTO: 1 % (ref 0–6)
ERYTHROCYTE [DISTWIDTH] IN BLOOD BY AUTOMATED COUNT: 12.4 % (ref 11.6–15.1)
EST. AVERAGE GLUCOSE BLD GHB EST-MCNC: 134 MG/DL
GFR SERPL CREATININE-BSD FRML MDRD: 100 ML/MIN/1.73SQ M
GLUCOSE P FAST SERPL-MCNC: 127 MG/DL (ref 65–99)
HBA1C MFR BLD: 6.3 %
HCT VFR BLD AUTO: 42 % (ref 36.5–49.3)
HDLC SERPL-MCNC: 45 MG/DL
HGB BLD-MCNC: 13.9 G/DL (ref 12–17)
IMM GRANULOCYTES # BLD AUTO: 0.02 THOUSAND/UL (ref 0–0.2)
IMM GRANULOCYTES NFR BLD AUTO: 0 % (ref 0–2)
LDLC SERPL CALC-MCNC: 66 MG/DL (ref 0–100)
LYMPHOCYTES # BLD AUTO: 2.66 THOUSANDS/ΜL (ref 0.6–4.47)
LYMPHOCYTES NFR BLD AUTO: 34 % (ref 14–44)
MCH RBC QN AUTO: 31.5 PG (ref 26.8–34.3)
MCHC RBC AUTO-ENTMCNC: 33.1 G/DL (ref 31.4–37.4)
MCV RBC AUTO: 95 FL (ref 82–98)
MONOCYTES # BLD AUTO: 0.57 THOUSAND/ΜL (ref 0.17–1.22)
MONOCYTES NFR BLD AUTO: 7 % (ref 4–12)
NEUTROPHILS # BLD AUTO: 4.38 THOUSANDS/ΜL (ref 1.85–7.62)
NEUTS SEG NFR BLD AUTO: 58 % (ref 43–75)
NONHDLC SERPL-MCNC: 97 MG/DL
NRBC BLD AUTO-RTO: 0 /100 WBCS
PLATELET # BLD AUTO: 267 THOUSANDS/UL (ref 149–390)
PMV BLD AUTO: 10.2 FL (ref 8.9–12.7)
POTASSIUM SERPL-SCNC: 3.9 MMOL/L (ref 3.5–5.3)
PROT SERPL-MCNC: 7.6 G/DL (ref 6.4–8.4)
PSA SERPL-MCNC: 0.31 NG/ML (ref 0–4)
RBC # BLD AUTO: 4.41 MILLION/UL (ref 3.88–5.62)
SODIUM SERPL-SCNC: 137 MMOL/L (ref 135–147)
TRIGL SERPL-MCNC: 153 MG/DL
WBC # BLD AUTO: 7.74 THOUSAND/UL (ref 4.31–10.16)

## 2024-11-04 PROCEDURE — 80061 LIPID PANEL: CPT

## 2024-11-04 PROCEDURE — 36415 COLL VENOUS BLD VENIPUNCTURE: CPT

## 2024-11-04 PROCEDURE — 85025 COMPLETE CBC W/AUTO DIFF WBC: CPT

## 2024-11-04 PROCEDURE — 80053 COMPREHEN METABOLIC PANEL: CPT

## 2024-11-04 PROCEDURE — G0103 PSA SCREENING: HCPCS

## 2024-11-04 PROCEDURE — 83036 HEMOGLOBIN GLYCOSYLATED A1C: CPT

## 2024-11-05 ENCOUNTER — TELEPHONE (OUTPATIENT)
Dept: FAMILY MEDICINE CLINIC | Facility: CLINIC | Age: 54
End: 2024-11-05

## 2024-11-05 NOTE — TELEPHONE ENCOUNTER
Spoke to the patient, he wanted to let us know he completed a home sleep study from [facility] in [place]. He states that someone from their office reached out to him and told him they would be sending all of the results here to our office for us to review. No results found here in the chart. This writer reached out to [facility] to verify that they sent it over and provide the correct information to send it to. They resent the information this morning. The patient is scheduled for an on-site sleep study there on 11/29 and they will be sending us those results when it is completed.

## 2024-11-11 ENCOUNTER — TELEPHONE (OUTPATIENT)
Age: 54
End: 2024-11-11

## 2024-11-11 NOTE — TELEPHONE ENCOUNTER
Patient returned the call and is aware of the need to come in for a follow up b/p check with Shana BURT. He is scheduled for 12/20/2024.  Patient states he is also going to [facility] for an overnight sleep study test on 11/29/2024 and hopefully will no more about the sleep apnea machine and settings.  Please advise. Thank you.

## 2024-11-11 NOTE — TELEPHONE ENCOUNTER
Patient wanted to come in to see Shana but there is no availability until 12/20/24.  He was hoping to follow up from his last visit regarding his blood pressure and to go over his sleep study results.  He says that the last time he took his blood pressure it was 141/80+.  I suggested he start keeping track of his blood pressure so that Shana can review.  Please contact patient and advise.  Thank you.

## 2024-11-27 PROBLEM — Z12.11 SCREEN FOR COLON CANCER: Status: RESOLVED | Noted: 2024-10-28 | Resolved: 2024-11-27

## 2024-11-27 PROBLEM — Z12.5 SCREENING FOR PROSTATE CANCER: Status: RESOLVED | Noted: 2024-10-28 | Resolved: 2024-11-27

## 2024-11-27 PROBLEM — Z13.1 SCREENING FOR DIABETES MELLITUS (DM): Status: RESOLVED | Noted: 2024-10-28 | Resolved: 2024-11-27

## 2024-12-03 ENCOUNTER — TELEPHONE (OUTPATIENT)
Age: 54
End: 2024-12-03

## 2024-12-03 NOTE — TELEPHONE ENCOUNTER
Patient called, Stated, he just received a call  from [facility] he had a over night sleep study test on 11/29/24.    Results given to him today by [facility], results will be faxed to Catie.  Pt wants to know if she had seen the Sleep study report yet?    Pt wants to know who will place the order for the Cpap machine and supplies?    Please be on the watch for the Sleep study report, if not received.     Please Advise and notify patient. Thank you

## 2024-12-12 NOTE — TELEPHONE ENCOUNTER
Please let pt know we received results and I'll leave this for Catie to review when she is back next week

## 2024-12-12 NOTE — TELEPHONE ENCOUNTER
Pt called in to find out if PCP received his sleep study results from [facility] facility. Pt reports this information was faxed over tot he PCP office.  PCP please follow up with pt.

## 2024-12-20 ENCOUNTER — OFFICE VISIT (OUTPATIENT)
Dept: FAMILY MEDICINE CLINIC | Facility: CLINIC | Age: 54
End: 2024-12-20
Payer: COMMERCIAL

## 2024-12-20 VITALS
BODY MASS INDEX: 40.09 KG/M2 | TEMPERATURE: 97.8 F | SYSTOLIC BLOOD PRESSURE: 142 MMHG | HEART RATE: 60 BPM | WEIGHT: 296 LBS | DIASTOLIC BLOOD PRESSURE: 86 MMHG | OXYGEN SATURATION: 99 % | HEIGHT: 72 IN

## 2024-12-20 DIAGNOSIS — I48.3 TYPICAL ATRIAL FLUTTER (HCC): ICD-10-CM

## 2024-12-20 DIAGNOSIS — I10 PRIMARY HYPERTENSION: Primary | ICD-10-CM

## 2024-12-20 DIAGNOSIS — G47.33 OSA (OBSTRUCTIVE SLEEP APNEA): ICD-10-CM

## 2024-12-20 DIAGNOSIS — I48.91 ATRIAL FIBRILLATION, UNSPECIFIED TYPE (HCC): ICD-10-CM

## 2024-12-20 LAB

## 2024-12-20 PROCEDURE — 99213 OFFICE O/P EST LOW 20 MIN: CPT | Performed by: NURSE PRACTITIONER

## 2024-12-20 RX ORDER — LOSARTAN POTASSIUM 25 MG/1
25 TABLET ORAL DAILY
Qty: 90 TABLET | Refills: 1 | Status: SHIPPED | OUTPATIENT
Start: 2024-12-20 | End: 2025-06-18

## 2024-12-20 RX ORDER — LOSARTAN POTASSIUM 25 MG/1
25 TABLET ORAL DAILY
Qty: 90 TABLET | Refills: 3 | Status: SHIPPED | OUTPATIENT
Start: 2024-12-20 | End: 2025-12-20

## 2024-12-20 RX ORDER — METOPROLOL SUCCINATE 50 MG/1
50 TABLET, EXTENDED RELEASE ORAL DAILY
Start: 2024-12-20 | End: 2025-06-18

## 2024-12-20 NOTE — PROGRESS NOTES
Name: Dwight Chappell      : 1970      MRN: [id number]  Encounter Provider: JAVED Montes  Encounter Date: 2024   Encounter department: [facility]  :  Assessment & Plan  Primary hypertension    Orders:  •  metoprolol succinate (TOPROL-XL) 50 mg 24 hr tablet; Take 1 tablet (50 mg total) by mouth daily  •  losartan (COZAAR) 25 mg tablet; Take 1 tablet (25 mg total) by mouth daily  •  losartan (COZAAR) 25 mg tablet; Take 1 tablet (25 mg total) by mouth daily  will add the Losartan 25 mg to his BP medications   Atrial fibrillation, unspecified type (HCC)    Orders:  •  metoprolol succinate (TOPROL-XL) 50 mg 24 hr tablet; Take 1 tablet (50 mg total) by mouth daily  Taking the Toprol and Flecanide   Typical atrial flutter (HCC)    Orders:  •  metoprolol succinate (TOPROL-XL) 50 mg 24 hr tablet; Take 1 tablet (50 mg total) by mouth daily    CARLOTTA (obstructive sleep apnea)  Patient is picking up his machine on Monday and 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 His blood is running in the 142/86 and similar to his home readings       Review of Systems   Constitutional:  Negative for activity change, appetite change, chills, diaphoresis, fatigue, fever and unexpected weight change.   HENT:  Negative for congestion, ear pain, hearing loss, postnasal drip, sinus pressure, sinus pain, sneezing and sore throat.    Eyes:  Negative for pain, redness and visual disturbance.   Respiratory:  Negative for cough and shortness of breath.    Cardiovascular:  Negative for chest pain and leg swelling.   Gastrointestinal:  Negative for abdominal pain, diarrhea, nausea and vomiting.   Endocrine: Negative.     Genitourinary: Negative.    Musculoskeletal:  Negative for arthralgias.   Skin: Negative.    Allergic/Immunologic: Negative.    Neurological:  Negative for dizziness and light-headedness.   Hematological: Negative.    Psychiatric/Behavioral:  Negative for behavioral problems and dysphoric mood.        Objective   /86 (BP Location: Left arm, Patient Position: Sitting)   Pulse 60   Temp 97.8 °F (36.6 °C) (Temporal)   Ht 6' (1.829 m)   Wt 134 kg (296 lb)   SpO2 99%   BMI 40.14 kg/m²      Physical Exam  Vitals and nursing note reviewed.   Constitutional:       General: He is not in acute distress.     Appearance: He is well-developed.   HENT:      Head: Normocephalic and atraumatic.      Right Ear: Tympanic membrane normal.      Left Ear: Tympanic membrane normal.      Nose: Nose normal.      Mouth/Throat:      Mouth: Mucous membranes are moist.   Eyes:      Pupils: Pupils are equal, round, and reactive to light.   Neck:      Thyroid: No thyromegaly.   Cardiovascular:      Rate and Rhythm: Normal rate and regular rhythm.      Heart sounds: Normal heart sounds. No murmur heard.  Pulmonary:      Effort: Pulmonary effort is normal. No respiratory distress.      Breath sounds: Normal breath sounds. No wheezing.   Abdominal:      General: Bowel sounds are normal.      Palpations: Abdomen is soft.   Musculoskeletal:         General: Normal range of motion.      Cervical back: Normal range of motion.   Skin:     General: Skin is warm and dry.   Neurological:      General: No focal deficit present.      Mental Status: He is alert and oriented to person, place, and time.   Psychiatric:         Mood and Affect: Mood normal.

## 2024-12-20 NOTE — ASSESSMENT & PLAN NOTE
Orders:  •  metoprolol succinate (TOPROL-XL) 50 mg 24 hr tablet; Take 1 tablet (50 mg total) by mouth daily  •  losartan (COZAAR) 25 mg tablet; Take 1 tablet (25 mg total) by mouth daily  •  losartan (COZAAR) 25 mg tablet; Take 1 tablet (25 mg total) by mouth daily  will add the Losartan 25 mg to his BP medications

## 2024-12-20 NOTE — ASSESSMENT & PLAN NOTE
Orders:  •  metoprolol succinate (TOPROL-XL) 50 mg 24 hr tablet; Take 1 tablet (50 mg total) by mouth daily  Taking the Toprol and Flecanide  no needs

## 2024-12-20 NOTE — ASSESSMENT & PLAN NOTE
Orders:  •  metoprolol succinate (TOPROL-XL) 50 mg 24 hr tablet; Take 1 tablet (50 mg total) by mouth daily

## 2024-12-20 NOTE — ASSESSMENT & PLAN NOTE
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

## 2024-12-23 LAB
DME PARACHUTE DELIVERY DATE ACTUAL: NORMAL
DME PARACHUTE DELIVERY DATE EXPECTED: NORMAL
DME PARACHUTE DELIVERY DATE REQUESTED: NORMAL
DME PARACHUTE ITEM DESCRIPTION: NORMAL
DME PARACHUTE ORDER STATUS: NORMAL
DME PARACHUTE SUPPLIER NAME: NORMAL
DME PARACHUTE SUPPLIER PHONE: NORMAL

## 2025-01-03 ENCOUNTER — OFFICE VISIT (OUTPATIENT)
Dept: FAMILY MEDICINE CLINIC | Facility: CLINIC | Age: 55
End: 2025-01-03
Payer: COMMERCIAL

## 2025-01-03 VITALS
HEIGHT: 72 IN | OXYGEN SATURATION: 100 % | TEMPERATURE: 97.1 F | BODY MASS INDEX: 39.82 KG/M2 | HEART RATE: 50 BPM | WEIGHT: 294 LBS | SYSTOLIC BLOOD PRESSURE: 132 MMHG | DIASTOLIC BLOOD PRESSURE: 88 MMHG

## 2025-01-03 DIAGNOSIS — G47.33 OSA (OBSTRUCTIVE SLEEP APNEA): Primary | ICD-10-CM

## 2025-01-03 PROCEDURE — 99213 OFFICE O/P EST LOW 20 MIN: CPT | Performed by: NURSE PRACTITIONER

## 2025-01-03 NOTE — PROGRESS NOTES
Name: Dwight Chappell      : 1970      MRN: [id number]  Encounter Provider: JAVED Montes  Encounter Date: 1/3/2025   Encounter department: [facility]  :  Assessment & Plan  CARLOTTA (obstructive sleep apnea)  Patient has his CPAP machine about 2 weeks ago and is using nightly and is wearing at least 4 hours a night >70% of the nights. Patient is tolerating and adjusting to use               History of Present Illness     Patient here today for follow up and reports that he is doing well and was able to get the CPAP machine and reports that he has been using the machine every night and has not noticed a difference yet and is trying to get used to using the machine at night. Patient is trying to follow up with his DOT next week .       Review of Systems   Constitutional:  Negative for activity change, appetite change, chills, diaphoresis, fatigue, fever and unexpected weight change.   HENT:  Negative for congestion, ear pain, hearing loss, postnasal drip, sinus pressure, sinus pain, sneezing and sore throat.    Eyes:  Negative for pain, redness and visual disturbance.   Respiratory:  Negative for cough and shortness of breath.    Cardiovascular:  Negative for chest pain and leg swelling.   Gastrointestinal:  Negative for abdominal pain, diarrhea, nausea and vomiting.   Endocrine: Negative.    Genitourinary: Negative.    Musculoskeletal:  Negative for arthralgias.   Allergic/Immunologic: Negative.    Neurological:  Negative for dizziness and light-headedness.   Hematological: Negative.    Psychiatric/Behavioral:  Negative for behavioral problems and dysphoric mood.        Objective   /88 (BP Location: Left arm, Patient Position: Sitting, Cuff Size: Large)   Pulse (!) 50   Temp (!) 97.1 °F (36.2 °C)   Ht 6' (1.829 m)   Wt 133 kg (294 lb)   SpO2 100%   BMI 39.87 kg/m²      Physical Exam  Constitutional:       General: He is not in acute distress.     Appearance: He is  well-developed.   HENT:      Head: Normocephalic and atraumatic.   Eyes:      Pupils: Pupils are equal, round, and reactive to light.   Neck:      Thyroid: No thyromegaly.   Cardiovascular:      Rate and Rhythm: Normal rate and regular rhythm.      Heart sounds: Normal heart sounds. No murmur heard.  Pulmonary:      Effort: Pulmonary effort is normal. No respiratory distress.      Breath sounds: Normal breath sounds. No wheezing.   Abdominal:      General: Bowel sounds are normal.      Palpations: Abdomen is soft.   Musculoskeletal:         General: Normal range of motion.      Cervical back: Normal range of motion.   Skin:     General: Skin is warm and dry.   Neurological:      Mental Status: He is alert and oriented to person, place, and time.

## 2025-01-03 NOTE — ASSESSMENT & PLAN NOTE
Patient has his CPAP machine about 2 weeks ago and is using nightly and is wearing at least 4 hours a night >70% of the nights. Patient is tolerating and adjusting to use

## 2025-02-28 ENCOUNTER — OFFICE VISIT (OUTPATIENT)
Dept: FAMILY MEDICINE CLINIC | Facility: CLINIC | Age: 55
End: 2025-02-28
Payer: COMMERCIAL

## 2025-02-28 VITALS
TEMPERATURE: 97.8 F | HEIGHT: 72 IN | DIASTOLIC BLOOD PRESSURE: 86 MMHG | WEIGHT: 291.4 LBS | SYSTOLIC BLOOD PRESSURE: 130 MMHG | BODY MASS INDEX: 39.47 KG/M2 | OXYGEN SATURATION: 98 %

## 2025-02-28 DIAGNOSIS — G47.33 OSA (OBSTRUCTIVE SLEEP APNEA): Primary | ICD-10-CM

## 2025-02-28 DIAGNOSIS — I48.91 ATRIAL FIBRILLATION, UNSPECIFIED TYPE (HCC): ICD-10-CM

## 2025-02-28 DIAGNOSIS — I48.3 TYPICAL ATRIAL FLUTTER (HCC): ICD-10-CM

## 2025-02-28 PROCEDURE — 99213 OFFICE O/P EST LOW 20 MIN: CPT | Performed by: NURSE PRACTITIONER

## 2025-02-28 NOTE — ASSESSMENT & PLAN NOTE
Patient is using his CPAP nightly and is compliant with the machine. Use of therapy for more than 4 hours nightly on 70% of nights during the past 30 day period since 12/23/2024 will continue with the CPAP

## 2025-02-28 NOTE — PROGRESS NOTES
Name: Dwight Chappell      : 1970      MRN: [id number]  Encounter Provider: JAVED Montes  Encounter Date: 2025   Encounter department: [facility]  :  Assessment & Plan  CARLOTTA (obstructive sleep apnea)  Patient is using his CPAP nightly and is compliant with the machine. Use of therapy for more than 4 hours nightly on 70% of nights during the past 30 day period since 2024 will continue with the CPAP        Atrial fibrillation, unspecified type (HCC)  Flecainide and Xarelto        Typical atrial flutter (HCC)  RRR no complaints               History of Present Illness   Patien there today for review and reports  that he is using the CPAP nightly and his report reflects that he is using his machine and is just still adjusting to having the machine and is trying to get different mask that can be better tolerated. No new troubles that he has.       Review of Systems   Constitutional:  Positive for fatigue. Negative for activity change, appetite change, chills, diaphoresis, fever and unexpected weight change.   HENT:  Negative for congestion, ear pain, hearing loss, postnasal drip, sinus pressure, sinus pain, sneezing and sore throat.    Eyes:  Negative for pain, redness and visual disturbance.   Respiratory:  Negative for cough and shortness of breath.    Cardiovascular:  Negative for chest pain and leg swelling.   Gastrointestinal:  Negative for abdominal pain, diarrhea, nausea and vomiting.   Endocrine: Negative.    Genitourinary: Negative.    Musculoskeletal:  Negative for arthralgias.   Skin: Negative.    Allergic/Immunologic: Negative.    Neurological:  Negative for dizziness and light-headedness.   Hematological: Negative.    Psychiatric/Behavioral:  Negative for behavioral problems and dysphoric mood.        Objective   /86 (BP Location: Left arm, Patient Position: Sitting)   Temp 97.8 °F (36.6 °C) (Temporal)   Ht 6' (1.829 m)   Wt 132 kg (291 lb 6.4 oz)    SpO2 98%   BMI 39.52 kg/m²      Physical Exam  Constitutional:       General: He is not in acute distress.     Appearance: He is well-developed.   HENT:      Head: Normocephalic and atraumatic.      Right Ear: Tympanic membrane normal.      Left Ear: Tympanic membrane normal.      Nose: Nose normal.      Mouth/Throat:      Mouth: Mucous membranes are moist.   Eyes:      Pupils: Pupils are equal, round, and reactive to light.   Neck:      Thyroid: No thyromegaly.   Cardiovascular:      Rate and Rhythm: Normal rate and regular rhythm.      Heart sounds: Normal heart sounds. No murmur heard.  Pulmonary:      Effort: Pulmonary effort is normal. No respiratory distress.      Breath sounds: Normal breath sounds. No wheezing.   Abdominal:      General: Bowel sounds are normal.      Palpations: Abdomen is soft.   Musculoskeletal:         General: Normal range of motion.      Cervical back: Normal range of motion.   Skin:     General: Skin is warm and dry.   Neurological:      General: No focal deficit present.      Mental Status: He is alert and oriented to person, place, and time.   Psychiatric:         Mood and Affect: Mood normal.

## 2025-03-19 ENCOUNTER — TELEPHONE (OUTPATIENT)
Age: 55
End: 2025-03-19

## 2025-04-10 ENCOUNTER — APPOINTMENT (OUTPATIENT)
Dept: RADIOLOGY | Facility: CLINIC | Age: 55
End: 2025-04-10
Payer: COMMERCIAL

## 2025-04-10 ENCOUNTER — TELEPHONE (OUTPATIENT)
Age: 55
End: 2025-04-10

## 2025-04-10 ENCOUNTER — OFFICE VISIT (OUTPATIENT)
Dept: FAMILY MEDICINE CLINIC | Facility: CLINIC | Age: 55
End: 2025-04-10
Payer: COMMERCIAL

## 2025-04-10 ENCOUNTER — APPOINTMENT (OUTPATIENT)
Dept: LAB | Facility: CLINIC | Age: 55
End: 2025-04-10
Payer: COMMERCIAL

## 2025-04-10 VITALS
TEMPERATURE: 97.5 F | SYSTOLIC BLOOD PRESSURE: 138 MMHG | BODY MASS INDEX: 39.82 KG/M2 | HEIGHT: 72 IN | HEART RATE: 53 BPM | WEIGHT: 294 LBS | DIASTOLIC BLOOD PRESSURE: 82 MMHG | OXYGEN SATURATION: 97 %

## 2025-04-10 DIAGNOSIS — M25.561 PAIN AND SWELLING OF RIGHT KNEE: Primary | ICD-10-CM

## 2025-04-10 DIAGNOSIS — M25.461 PAIN AND SWELLING OF RIGHT KNEE: Primary | ICD-10-CM

## 2025-04-10 DIAGNOSIS — M25.561 PAIN AND SWELLING OF RIGHT KNEE: ICD-10-CM

## 2025-04-10 DIAGNOSIS — M25.461 PAIN AND SWELLING OF RIGHT KNEE: ICD-10-CM

## 2025-04-10 LAB
BASOPHILS # BLD AUTO: 0.02 THOUSANDS/ÂΜL (ref 0–0.1)
BASOPHILS NFR BLD AUTO: 0 % (ref 0–1)
EOSINOPHIL # BLD AUTO: 0.05 THOUSAND/ÂΜL (ref 0–0.61)
EOSINOPHIL NFR BLD AUTO: 0 % (ref 0–6)
ERYTHROCYTE [DISTWIDTH] IN BLOOD BY AUTOMATED COUNT: 12.9 % (ref 11.6–15.1)
HCT VFR BLD AUTO: 42 % (ref 36.5–49.3)
HGB BLD-MCNC: 13.1 G/DL (ref 12–17)
IMM GRANULOCYTES # BLD AUTO: 0.02 THOUSAND/UL (ref 0–0.2)
IMM GRANULOCYTES NFR BLD AUTO: 0 % (ref 0–2)
LYMPHOCYTES # BLD AUTO: 2.79 THOUSANDS/ÂΜL (ref 0.6–4.47)
LYMPHOCYTES NFR BLD AUTO: 24 % (ref 14–44)
MCH RBC QN AUTO: 30 PG (ref 26.8–34.3)
MCHC RBC AUTO-ENTMCNC: 31.2 G/DL (ref 31.4–37.4)
MCV RBC AUTO: 96 FL (ref 82–98)
MONOCYTES # BLD AUTO: 0.64 THOUSAND/ÂΜL (ref 0.17–1.22)
MONOCYTES NFR BLD AUTO: 5 % (ref 4–12)
NEUTROPHILS # BLD AUTO: 8.34 THOUSANDS/ÂΜL (ref 1.85–7.62)
NEUTS SEG NFR BLD AUTO: 71 % (ref 43–75)
NRBC BLD AUTO-RTO: 0 /100 WBCS
PLATELET # BLD AUTO: 269 THOUSANDS/UL (ref 149–390)
PMV BLD AUTO: 10.7 FL (ref 8.9–12.7)
RBC # BLD AUTO: 4.37 MILLION/UL (ref 3.88–5.62)
URATE SERPL-MCNC: 4.7 MG/DL (ref 3.5–8.5)
WBC # BLD AUTO: 11.86 THOUSAND/UL (ref 4.31–10.16)

## 2025-04-10 PROCEDURE — 85025 COMPLETE CBC W/AUTO DIFF WBC: CPT

## 2025-04-10 PROCEDURE — 86618 LYME DISEASE ANTIBODY: CPT

## 2025-04-10 PROCEDURE — 99214 OFFICE O/P EST MOD 30 MIN: CPT

## 2025-04-10 PROCEDURE — 84550 ASSAY OF BLOOD/URIC ACID: CPT

## 2025-04-10 PROCEDURE — 73562 X-RAY EXAM OF KNEE 3: CPT

## 2025-04-10 PROCEDURE — 36415 COLL VENOUS BLD VENIPUNCTURE: CPT

## 2025-04-10 RX ORDER — SENNOSIDES 8.6 MG
650 CAPSULE ORAL EVERY 8 HOURS PRN
Qty: 30 TABLET | Refills: 0 | Status: SHIPPED | OUTPATIENT
Start: 2025-04-10

## 2025-04-10 NOTE — LETTER
April 10, 2025     Patient: Dwight Chappell  YOB: 1970  Date of Visit: 4/10/2025      To Whom it May Concern:    Dwight Chappell is under my professional care. Dwight was seen in my office on 4/10/2025. Dwight may return to work on 4/14/25 . Please excuse him from 4/8-4/14/25    If you have any questions or concerns, please don't hesitate to call.         Sincerely,          Miri Pa PA-C        CC: No Recipients

## 2025-04-10 NOTE — PROGRESS NOTES
Name: Dwight Chappell      : 1970      MRN: [id number]  Encounter Provider: Miri Pa PA-C  Encounter Date: 4/10/2025   Encounter department: [facility]  :  Assessment & Plan  Pain and swelling of right knee  Right knee pain and swelling after going on a long walk x 4-5 days  On exam, tenderness to palpation of suprapatellar region with mild swelling. Decreased ability to flex/extend joint due to discomfort, but otherwise unremarkable and neurovascularly intact which is reassuring.   Will check XR of knee for further evaluation, along with uric acid, lyme, and CBC to rule out underlying cause.   Otherwise, sent in tylenol to take as needed for pain and recommended supportive care with ice/elevation   Will follow up with XR/lab results when obtained  ER precautions provided    Orders:    Uric acid; Future    Lyme Total AB W Reflex to IGM/IGG; Future    CBC and differential; Future    acetaminophen (TYLENOL) 650 mg CR tablet; Take 1 tablet (650 mg total) by mouth every 8 (eight) hours as needed for mild pain    XR knee 3 vw right non injury; Future      Of note, bradycardic at 53 bpm. This appears to be around his baseline per review of prior visits. Denies any dizziness, lightheadedness. Follows with cardiology.        History of Present Illness   CC: right knee pain and swelling x 4-5 days    Patient presents for evaluation of right knee pain and swelling x 4-5 days  Reports he went on long walk over the weekend, and shortly after started with symptoms. Does not recall any injury or trauma. Does report he has chronic issues and pain in the knees, but not like this.   Pain is at the top of the knee. He denies any radiation of pain, localized to top of knee. Hurts when sitting, standing, walking and bending.   Tylenol does help.   He is on Xarelto so can't take NSAIDs  Has never had any clots   No calf pain or swelling  No numbness or tingling   Does not recall any insect/tick bites  during his walk       Review of Systems   Constitutional:  Negative for chills, diaphoresis and fever.   Respiratory:  Negative for chest tightness, shortness of breath and wheezing.    Cardiovascular:  Negative for chest pain, palpitations and leg swelling.   Musculoskeletal:  Positive for arthralgias, gait problem and joint swelling.   Neurological:  Negative for dizziness, light-headedness and headaches.       Objective   /82   Pulse (!) 53 Comment: pt reports this is his normal  Temp 97.5 °F (36.4 °C)   Ht 6' (1.829 m)   Wt 133 kg (294 lb)   SpO2 97%   BMI 39.87 kg/m²      Physical Exam  Vitals reviewed.   Constitutional:       General: He is not in acute distress.     Appearance: Normal appearance. He is not ill-appearing or diaphoretic.   HENT:      Head: Normocephalic and atraumatic.      Right Ear: External ear normal.      Left Ear: External ear normal.      Nose: Nose normal.      Mouth/Throat:      Mouth: Mucous membranes are moist.   Eyes:      General:         Right eye: No discharge.         Left eye: No discharge.      Conjunctiva/sclera: Conjunctivae normal.   Cardiovascular:      Rate and Rhythm: Normal rate and regular rhythm.      Heart sounds: Normal heart sounds.   Pulmonary:      Effort: Pulmonary effort is normal. No respiratory distress.      Breath sounds: Normal breath sounds. No wheezing, rhonchi or rales.   Musculoskeletal:      Right knee: Swelling present. Decreased range of motion. Tenderness present.      Right lower leg: No swelling, tenderness or bony tenderness. No edema.      Left lower leg: No swelling, tenderness or bony tenderness. No edema.        Legs:    Skin:     General: Skin is warm.   Neurological:      General: No focal deficit present.      Mental Status: He is alert.   Psychiatric:         Mood and Affect: Mood normal.

## 2025-04-10 NOTE — TELEPHONE ENCOUNTER
WHO - patient     WHAT - swollen and pain right knee    WHEN - getting worst since Sunday     How Often/Duration -    Pain -    Alleviating Factors (anything they tried to use to help so far) -  Next Steps -    Callback- patient

## 2025-04-11 ENCOUNTER — RESULTS FOLLOW-UP (OUTPATIENT)
Dept: FAMILY MEDICINE CLINIC | Facility: CLINIC | Age: 55
End: 2025-04-11

## 2025-04-11 DIAGNOSIS — M25.461 KNEE EFFUSION, RIGHT: ICD-10-CM

## 2025-04-11 DIAGNOSIS — M25.561 PAIN AND SWELLING OF RIGHT KNEE: Primary | ICD-10-CM

## 2025-04-11 DIAGNOSIS — M25.461 PAIN AND SWELLING OF RIGHT KNEE: Primary | ICD-10-CM

## 2025-04-11 DIAGNOSIS — R79.89 ABNORMAL CBC: Primary | ICD-10-CM

## 2025-04-11 LAB — B BURGDOR IGG+IGM SER QL IA: NEGATIVE

## 2025-04-11 NOTE — TELEPHONE ENCOUNTER
Patient returned call.  Relayed provider's comments and recommendations.  Patient expressed understanding.    Patient would like referral to Orthopedics and was given phone number ([phone] - [facility]) for scheduling.  Advised to wait until referral is placed.    Patient will complete labs in one week as requested, and report to ER if any of the listed symptoms occur.

## 2025-04-18 VITALS — WEIGHT: 297.4 LBS | BODY MASS INDEX: 40.28 KG/M2 | HEIGHT: 72 IN

## 2025-04-18 DIAGNOSIS — M25.461 PAIN AND SWELLING OF RIGHT KNEE: ICD-10-CM

## 2025-04-18 DIAGNOSIS — M17.11 PRIMARY OSTEOARTHRITIS OF RIGHT KNEE: Primary | ICD-10-CM

## 2025-04-18 DIAGNOSIS — M25.561 PAIN AND SWELLING OF RIGHT KNEE: ICD-10-CM

## 2025-04-18 PROCEDURE — 99203 OFFICE O/P NEW LOW 30 MIN: CPT | Performed by: ORTHOPAEDIC SURGERY

## 2025-04-18 NOTE — PROGRESS NOTES
Name: Dwight Chappell      : 1970      MRN: [id number]  Encounter Provider: Yair Sorenson DO  Encounter Date: 2025   Encounter department: [facility] [place]  :  Assessment & Plan  Pain and swelling of right knee    Orders:    Ambulatory Referral to Orthopedic Surgery    Primary osteoarthritis of right knee         Right knee osteoarthritis with bilateral lower extremity swelling    X-rays reviewed and discussed with patient  Patient to be full weightbearing to RLE (Right Lower Extremity)  ROM as tolerated to  RLE (Right Lower Extremity)  Nonoperative treatments were discussed including oral and topical analgesics, activity modification, formal physical therapy, injection therapy and home exercise program.    Patient's symptoms have resolved since the onset last week.  Patient to continue with home exercise plan as prescribed.   If symptoms return, we will consider aspiration and injection therapy.  Discussed benefits of weight loss   Patient to follow up as needed     Chief Complaint     Right knee pain    History of the Present Illness   History of Present Illness   HPI   Dwight Chappell is a 54 y.o. male with Right knee pain and swelling that started approximately two weeks ago on 25.  He was referred by Miri Pa.  He states he has a history of intermittent bilateral knee pain and swelling for the past 5 years, denies any treatment for this. He states after walking a long distance to the [facility] house two weeks ago his knee became very swollen and tender to touch to the anterior medial aspect. He addressed this with his PCP on 4/10, labs were ran provided tylenol and x-rays were obtained. Lab results of uric acid and lyme's were normal. He reports today with improvement to his pain and swelling as he has been taking tylenol. Patient is able to return to all of his normal activities. Denies numbness or tingling to his toes or calf pain. Denies treatment in  the past of formal PT, injections, or bracing.     Review of Systems     Review of Systems   Constitutional:  Negative for chills and fever.   HENT:  Negative for ear pain and sore throat.    Eyes:  Negative for pain and visual disturbance.   Respiratory:  Negative for cough and shortness of breath.    Cardiovascular:  Negative for chest pain and palpitations.   Gastrointestinal:  Negative for abdominal pain and vomiting.   Genitourinary:  Negative for dysuria and hematuria.   Musculoskeletal:  Positive for arthralgias. Negative for back pain.   Skin:  Negative for color change and rash.   Neurological:  Negative for seizures and syncope.   All other systems reviewed and are negative.      Physical Exam   Objective   Ht 6' (1.829 m)   Wt 135 kg (297 lb 6.4 oz)   BMI 40.33 kg/m²        Right Knee  Range of motion from 3 to 115.    There is no crepitus with range of motion.   There is no effusion.    There is no tenderness over the medial and lateral joint line.    There is 5/5 quadriceps strength   The patient is able to perform a straight leg raise.    The patient is neurovascular intact distally.  Bilateral distal lower extremity swelling        Eyes:  Anicteric sclerae.  Neck:  Supple.  Lungs:  Normal respiratory effort.  Cardiovascular:  Capillary refill is less than 2 seconds.  Skin:  Intact without erythema.  Neurologic:  Sensation grossly intact to light touch.  Psychiatric:  Mood and affect are appropriate.    Data Review     I have personally reviewed pertinent films in PACS, and my interpretation follows:    X-[facility] taken 4/10/2025 of [facility] independently reviewed and demonstrate moderate to severe medial compartment osteoarthritis and joint space narrowing. Small effusion and osteophytes. No evidence of a fracture or dislocation.     Lab results of Uric Acid normal of 4.7 date 4/10/25  Lab results of Lyme's normal date 4/10/25     Lab Results   Component Value Date/Time    HGBA1C 6.3 (H) 11/04/2024  07:27 AM       Past Medical History:   Diagnosis Date    GERD (gastroesophageal reflux disease)     Hypercholesteremia     Malignant melanoma (HCC) 04/2012    Superficial spreading Horacio level III Breaslow thickness 0.58 mm    Nonmelanoma skin cancer     Last Assessed:  5/18/17    Vitamin D deficiency        History reviewed. No pertinent surgical history.    Allergies   Allergen Reactions    Aspirin      Reaction Date: 26Mar2012;     Penicillins     Tetracycline        Current Outpatient Medications on File Prior to Visit   Medication Sig Dispense Refill    acetaminophen (TYLENOL) 650 mg CR tablet Take 1 tablet (650 mg total) by mouth every 8 (eight) hours as needed for mild pain 30 tablet 0    atorvastatin (LIPITOR) 10 mg tablet Take 1 tablet by mouth daily      cholecalciferol (VITAMIN D3) 1,000 units tablet Take 1 tablet by mouth daily      flecainide (TAMBOCOR) 100 mg tablet Take 1 tablet by mouth every 12 (twelve) hours      losartan (COZAAR) 25 mg tablet Take 1 tablet (25 mg total) by mouth daily 90 tablet 3    metoprolol succinate (TOPROL-XL) 50 mg 24 hr tablet Take 1 tablet (50 mg total) by mouth daily      multivitamin (THERAGRAN) TABS Take 1 tablet by mouth daily      rivaroxaban (XARELTO) 20 mg tablet Take 1 tablet by mouth daily      losartan (COZAAR) 25 mg tablet Take 1 tablet (25 mg total) by mouth daily (Patient not taking: Reported on 4/10/2025) 90 tablet 1     No current facility-administered medications on file prior to visit.       Social History     Tobacco Use    Smoking status: Never    Smokeless tobacco: Never   Vaping Use    Vaping status: Never Used   Substance Use Topics    Alcohol use: No    Drug use: No       History reviewed. No pertinent family history.      Procedures Performed     Procedures  No procedures were performed today     Scribe Attestation      I,:  Kavitha Mejia am acting as a scribe while in the presence of the attending physician.:       I,:  Yair Sorenson DO  personally performed the services described in this documentation    as scribed in my presence.:

## 2025-08-18 ENCOUNTER — OFFICE VISIT (OUTPATIENT)
Dept: FAMILY MEDICINE CLINIC | Facility: CLINIC | Age: 55
End: 2025-08-18
Payer: COMMERCIAL

## 2025-08-18 VITALS
HEIGHT: 72 IN | DIASTOLIC BLOOD PRESSURE: 80 MMHG | WEIGHT: 293 LBS | OXYGEN SATURATION: 97 % | TEMPERATURE: 97.9 F | SYSTOLIC BLOOD PRESSURE: 128 MMHG | HEART RATE: 94 BPM | BODY MASS INDEX: 39.68 KG/M2

## 2025-08-18 DIAGNOSIS — I48.3 TYPICAL ATRIAL FLUTTER (HCC): ICD-10-CM

## 2025-08-18 DIAGNOSIS — Z12.11 SCREEN FOR COLON CANCER: ICD-10-CM

## 2025-08-18 DIAGNOSIS — F43.21 SITUATIONAL DEPRESSION: ICD-10-CM

## 2025-08-18 DIAGNOSIS — Z13.1 SCREENING FOR DIABETES MELLITUS: ICD-10-CM

## 2025-08-18 DIAGNOSIS — I48.91 ATRIAL FIBRILLATION, UNSPECIFIED TYPE (HCC): ICD-10-CM

## 2025-08-18 DIAGNOSIS — I10 PRIMARY HYPERTENSION: Primary | ICD-10-CM

## 2025-08-18 DIAGNOSIS — E78.5 DYSLIPIDEMIA: ICD-10-CM

## 2025-08-18 PROCEDURE — 99214 OFFICE O/P EST MOD 30 MIN: CPT | Performed by: NURSE PRACTITIONER

## 2025-08-18 RX ORDER — HYDROCHLOROTHIAZIDE 12.5 MG/1
12.5 TABLET ORAL DAILY
Qty: 30 TABLET | Refills: 5 | Status: SHIPPED | OUTPATIENT
Start: 2025-08-18 | End: 2026-02-14